# Patient Record
Sex: FEMALE | Race: OTHER | NOT HISPANIC OR LATINO | URBAN - METROPOLITAN AREA
[De-identification: names, ages, dates, MRNs, and addresses within clinical notes are randomized per-mention and may not be internally consistent; named-entity substitution may affect disease eponyms.]

---

## 2019-01-26 ENCOUNTER — EMERGENCY (EMERGENCY)
Facility: HOSPITAL | Age: 34
LOS: 1 days | Discharge: HOME | End: 2019-01-26
Admitting: EMERGENCY MEDICINE
Payer: SELF-PAY

## 2019-01-26 VITALS
OXYGEN SATURATION: 100 % | TEMPERATURE: 98 F | RESPIRATION RATE: 17 BRPM | DIASTOLIC BLOOD PRESSURE: 70 MMHG | HEART RATE: 91 BPM | SYSTOLIC BLOOD PRESSURE: 114 MMHG

## 2019-01-26 VITALS
HEART RATE: 92 BPM | DIASTOLIC BLOOD PRESSURE: 66 MMHG | RESPIRATION RATE: 18 BRPM | OXYGEN SATURATION: 98 % | SYSTOLIC BLOOD PRESSURE: 112 MMHG

## 2019-01-26 PROCEDURE — 99053 MED SERV 10PM-8AM 24 HR FAC: CPT

## 2019-01-26 PROCEDURE — 99283 EMERGENCY DEPT VISIT LOW MDM: CPT | Mod: 25

## 2019-01-26 RX ORDER — ONDANSETRON 8 MG/1
4 TABLET, FILM COATED ORAL ONCE
Qty: 0 | Refills: 0 | Status: COMPLETED | OUTPATIENT
Start: 2019-01-26 | End: 2019-01-26

## 2019-01-26 NOTE — ED ADULT NURSE NOTE - CHIEF COMPLAINT QUOTE
patient was visitor of intoxicated patient, found throwing up in bathroom, patient unable to walk straight line. patient actively vomiting during triage.

## 2019-01-26 NOTE — ED ADULT TRIAGE NOTE - CHIEF COMPLAINT QUOTE
patient was visitor of intoxicated patient, found throwing up in bathroom, patient unable to walk straight line. patient actively vomiting during triage. patient was visitor of intoxicated. patient found throwing up in bathroom, patient unable to walk straight line. patient actively vomiting during triage.

## 2019-01-26 NOTE — ED PROVIDER NOTE - NSFOLLOWUPINSTRUCTIONS_ED_ALL_ED_FT
AVOID EXCESSIVE ALCOHOL CONSUMPTION.   STAY WELL HYDRATED.   FOLLOW UP WITH YOUR PMD IN 2 DAYS TO RECHECK YOUR HEART RATE WHICH WAS MILDLY ELEVATED TODAY.   RETURN TO ER FOR CHEST PAIN, SOB, PALPITATIONS, ANY OTHER NEW OR CONCERNING SYMPTOMS.

## 2019-01-26 NOTE — ED PROVIDER NOTE - OBJECTIVE STATEMENT
F pt in 20-30s presents for alcohol intox. uncooperative with interview. accompanied friend to ED but then was vomiting in the bathroom. denies any injuries. denies any drug use.

## 2019-01-26 NOTE — ED PROVIDER NOTE - MEDICAL DECISION MAKING DETAILS
pt presents for alcohol intoxication and vomiting. given zofran. monitored for sobriety. clinically sober. will d/c.

## 2019-01-30 DIAGNOSIS — R41.82 ALTERED MENTAL STATUS, UNSPECIFIED: ICD-10-CM

## 2019-01-30 DIAGNOSIS — F10.120 ALCOHOL ABUSE WITH INTOXICATION, UNCOMPLICATED: ICD-10-CM
